# Patient Record
Sex: MALE | Race: WHITE
[De-identification: names, ages, dates, MRNs, and addresses within clinical notes are randomized per-mention and may not be internally consistent; named-entity substitution may affect disease eponyms.]

---

## 2019-09-25 ENCOUNTER — HOSPITAL ENCOUNTER (OUTPATIENT)
Dept: HOSPITAL 84 - D.LABREF | Age: 54
Discharge: HOME | End: 2019-09-25
Attending: UROLOGY
Payer: MEDICARE

## 2019-09-25 VITALS — BODY MASS INDEX: 37.6 KG/M2

## 2019-09-25 DIAGNOSIS — R31.9: Primary | ICD-10-CM

## 2019-10-10 ENCOUNTER — HOSPITAL ENCOUNTER (OUTPATIENT)
Dept: HOSPITAL 84 - D.OPS | Age: 54
Discharge: HOME | End: 2019-10-10
Attending: UROLOGY
Payer: MEDICARE

## 2019-10-10 VITALS — BODY MASS INDEX: 34.72 KG/M2 | BODY MASS INDEX: 34.72 KG/M2 | HEIGHT: 71 IN | WEIGHT: 248 LBS

## 2019-10-10 VITALS — SYSTOLIC BLOOD PRESSURE: 137 MMHG | DIASTOLIC BLOOD PRESSURE: 95 MMHG

## 2019-10-10 DIAGNOSIS — R36.1: ICD-10-CM

## 2019-10-10 DIAGNOSIS — R31.0: Primary | ICD-10-CM

## 2019-10-10 LAB
ANION GAP SERPL CALC-SCNC: 10.1 MMOL/L (ref 8–16)
BUN SERPL-MCNC: 13 MG/DL (ref 7–18)
CALCIUM SERPL-MCNC: 8.6 MG/DL (ref 8.5–10.1)
CHLORIDE SERPL-SCNC: 103 MMOL/L (ref 98–107)
CO2 SERPL-SCNC: 29.4 MMOL/L (ref 21–32)
CREAT SERPL-MCNC: 0.7 MG/DL (ref 0.6–1.3)
ERYTHROCYTE [DISTWIDTH] IN BLOOD BY AUTOMATED COUNT: 13.1 % (ref 11.5–14.5)
GLUCOSE SERPL-MCNC: 175 MG/DL (ref 74–106)
HCT VFR BLD CALC: 46.5 % (ref 42–54)
HGB BLD-MCNC: 15.8 G/DL (ref 13.5–17.5)
MCH RBC QN AUTO: 32.2 PG (ref 26–34)
MCHC RBC AUTO-ENTMCNC: 34 G/DL (ref 31–37)
MCV RBC: 94.7 FL (ref 80–100)
OSMOLALITY SERPL CALC.SUM OF ELEC: 279 MOSM/KG (ref 275–300)
PLATELET # BLD: 267 10X3/UL (ref 130–400)
PMV BLD AUTO: 10.5 FL (ref 7.4–10.4)
POTASSIUM SERPL-SCNC: 4.5 MMOL/L (ref 3.5–5.1)
RBC # BLD AUTO: 4.91 10X6/UL (ref 4.2–6.1)
SODIUM SERPL-SCNC: 138 MMOL/L (ref 136–145)
WBC # BLD AUTO: 7.2 10X3/UL (ref 4.8–10.8)

## 2019-10-10 NOTE — OP
PATIENT NAME:  DEEJAY GARCIA                            MEDICAL RECORD: [id number]
:69                                             LOCATION:.OPS          
                                                         ADMISSION DATE:        
SURGEON:  CONG KENNEDY MD              
 
 
DATE OF OPERATION:  10/10/2019
 
SURGEON:  Cong Kennedy MD
 
ANESTHESIA:  TIVA by Benitez Rubin CRNA
 
DIAGNOSES:  Gross hematuria, hematospermia.
 
PROCEDURE:  Cystoscopy.
 
FINDINGS:  On cystoscopy, nonobstructive vascular prostate.  Single ureteral
orifices bilaterally with no bladder tumors.  Diffusely inflamed bladder.
 
ESTIMATED BLOOD LOSS:  None.
 
CLINICAL HISTORY:  This is a 54-year-old male, who was complaining of gross
hematuria and hematospermia.  He has a long history of medical issues including
diabetes mellitus type 2, hepatitis C, mental illness including paranoid
schizophrenia and depression with 2 suicide attempts in the past.  He also has
substance abuse with cocaine, marijuana and methamphetamines.  He is a smoker. 
He had a CT scan of the abdomen and pelvis ordered and so far I have not seen it
done yet.  He comes today for cystoscopy.  He is not allergic to any
medications.  He was given Ancef on call to the OR.
 
DESCRIPTION OF PROCEDURE:  The patient was given IV sedation.  He was then
placed into lithotomy position and prepped and draped.  A 17-French cystoscope
with 30-degree lens was used for visualization.  Findings are as outlined above.
 No tumors were seen.  The hematuria and hematospermia are most likely from the
vascular prostate.  The bladder was emptied and then the scope was removed.  The
patient will be seen in followup on a p.r.n. basis.
 
TRANSINT:[id number] Voice Confirmation ID: [id number] DOCUMENT ID: [id number]
                                           
                                           CONG KENNEDY MD              
 
 
 
Electronically Signed by CONG KENNEDY on 10/10/19 at 1236
 
 
 
 
 
 
CC:                                                             [id number]
DICTATION DATE: 10/10/19 1114     :     10/10/19 1126      [id number] [place] [address]

## 2019-10-10 NOTE — NUR
1145-DISCHARGE CRITERIA MET. REVIEWED POST OPERATIVE INSTRUCTIONS
WITH PT. VERBALIZED UNDERSTANDING WITHOUT FURTHER QUESTIONS OR
CONCERNS. VSS. ESCORTED OUT VIA W/C WITH FEMALE FRIEND TO DRIVE HOME

## 2019-10-16 ENCOUNTER — HOSPITAL ENCOUNTER (OUTPATIENT)
Dept: HOSPITAL 84 - D.OPS | Age: 54
Discharge: HOME | End: 2019-10-16
Attending: SURGERY
Payer: MEDICARE

## 2019-10-16 VITALS — DIASTOLIC BLOOD PRESSURE: 79 MMHG | SYSTOLIC BLOOD PRESSURE: 121 MMHG

## 2019-10-16 VITALS — HEIGHT: 71 IN | WEIGHT: 248 LBS | BODY MASS INDEX: 34.72 KG/M2

## 2019-10-16 DIAGNOSIS — K40.20: Primary | ICD-10-CM

## 2019-10-16 LAB
ANION GAP SERPL CALC-SCNC: 12.3 MMOL/L (ref 8–16)
BUN SERPL-MCNC: 11 MG/DL (ref 7–18)
CALCIUM SERPL-MCNC: 9.1 MG/DL (ref 8.5–10.1)
CHLORIDE SERPL-SCNC: 101 MMOL/L (ref 98–107)
CO2 SERPL-SCNC: 27.3 MMOL/L (ref 21–32)
CREAT SERPL-MCNC: 0.7 MG/DL (ref 0.6–1.3)
ERYTHROCYTE [DISTWIDTH] IN BLOOD BY AUTOMATED COUNT: 13.6 % (ref 11.5–14.5)
GLUCOSE SERPL-MCNC: 201 MG/DL (ref 74–106)
HCT VFR BLD CALC: 47.3 % (ref 42–54)
HGB BLD-MCNC: 16.2 G/DL (ref 13.5–17.5)
MCH RBC QN AUTO: 32 PG (ref 26–34)
MCHC RBC AUTO-ENTMCNC: 34.2 G/DL (ref 31–37)
MCV RBC: 93.5 FL (ref 80–100)
OSMOLALITY SERPL CALC.SUM OF ELEC: 276 MOSM/KG (ref 275–300)
PLATELET # BLD: 264 10X3/UL (ref 130–400)
PMV BLD AUTO: 10.2 FL (ref 7.4–10.4)
POTASSIUM SERPL-SCNC: 4.6 MMOL/L (ref 3.5–5.1)
RBC # BLD AUTO: 5.06 10X6/UL (ref 4.2–6.1)
SODIUM SERPL-SCNC: 136 MMOL/L (ref 136–145)
WBC # BLD AUTO: 7.7 10X3/UL (ref 4.8–10.8)

## 2019-10-16 NOTE — NUR
DR LORA NOTIFIED ANDREVIEWED PT's BEHAVIOR AND ASSESSMENT RESULTS. PT IS A
LOW RISK PER DR LORA. DR LORA STATED TO GIVE RESOURCES TO PT AT TIME OF
DISCHARGE. NO FURTHER ORDERS AT THIS TIME. RESOURCES REVIEWED WITH PT AND HE
VERBALIZED UNDERSTANDING.

## 2019-10-16 NOTE — NUR
1215 PT IN A RUSH TO GO HOME. C/0 PAIN TO ABDOMEN BUT SLIGHTLY
SLEEPY, RECIEVED A FULL LIQ TRAY MEDICATED WITH FLOMAX AND PAIN MED